# Patient Record
Sex: MALE | Race: WHITE | URBAN - METROPOLITAN AREA
[De-identification: names, ages, dates, MRNs, and addresses within clinical notes are randomized per-mention and may not be internally consistent; named-entity substitution may affect disease eponyms.]

---

## 2017-07-06 ENCOUNTER — OFFICE VISIT (OUTPATIENT)
Dept: SLEEP MEDICINE | Facility: CLINIC | Age: 52
End: 2017-07-06
Payer: COMMERCIAL

## 2017-07-06 VITALS
HEIGHT: 72 IN | OXYGEN SATURATION: 96 % | DIASTOLIC BLOOD PRESSURE: 92 MMHG | BODY MASS INDEX: 39.14 KG/M2 | SYSTOLIC BLOOD PRESSURE: 155 MMHG | RESPIRATION RATE: 18 BRPM | HEART RATE: 93 BPM | WEIGHT: 289 LBS

## 2017-07-06 DIAGNOSIS — R03.0 ELEVATED BP WITHOUT DIAGNOSIS OF HYPERTENSION: Primary | ICD-10-CM

## 2017-07-06 DIAGNOSIS — G47.33 OSA (OBSTRUCTIVE SLEEP APNEA): ICD-10-CM

## 2017-07-06 DIAGNOSIS — E66.09 NON MORBID OBESITY DUE TO EXCESS CALORIES: ICD-10-CM

## 2017-07-06 PROCEDURE — 99204 OFFICE O/P NEW MOD 45 MIN: CPT | Performed by: INTERNAL MEDICINE

## 2017-07-06 NOTE — PATIENT INSTRUCTIONS

## 2017-07-06 NOTE — PROGRESS NOTES
Sleep Consultation:    Date on this visit: 7/6/2017    Rubén Rosas is sent by No ref. provider found for a sleep consultation regarding possible ARMANDO.    Primary Physician: No primary care provider on file.     Rubén Rosas is a 51 year old male presenting with complaints of needing evaluation for ARMANDO.    He has a history of obesity.    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  Starts anywhere between 3 AM and 6 AM.  Usually finishing between 5 and 7 PM.  Not usually working Saturdays anymore.   Up anywhere between 2 and 4:45 AM depending on the day.  Typically getting into bed between 9 (at the earliest) and 10:30 PM.     Weekends he is up a little later and sleeps in until 7 - 8 AM.   Falls asleep quickly.  No TV in bedroom.    Sleep Disordered Breathing - Snores intermittently.  Denies witnessed apneas or snort arousals.     Occasional nocturia.  No nocturnal GERD.    Upon waking feels fine.  He denies morning headaches.  No morning dry mouth.  No morning sinus congestion.   Patient was counseled on the importance of driving while alert, to pull over if drowsy, or nap before getting into the vehicle if sleepy.    Movement/Behaviors - No somniloquy.  No somnambulism.  No sleep related eating.  No dream enactment behavior.   Patient denies typical restless legs syndrome symptoms.      Alcohol use - Drinking every night.  Weekdays typically having 1 - 2 drinks.  Weekends will have significantly more.    Caffeine intake - 1/2 cups/day of coffee and 1 can per day. Last caffeine intake is usually in the afternoon.  Tobacco exposure - Chewing tobacco (2 tins per week).  Illicit substances - None    Lives with wife and daughter, although 2 kids in college are home for the summer.  Has 2 pets, 1 dog and 1 cat.     Does have family history of Obstructive Sleep Apnea on CPAP in brother, and snoring in father.    Allergies:    No Known Allergies    Medications:     Current Outpatient Prescriptions   Medication Sig Dispense Refill     ADVIL 200 MG PO TABS 2 TABLET EVERY 4 TO 6 HOURS AS NEEDED         Problem List:  Patient Active Problem List    Diagnosis Date Noted     Elevated BP without diagnosis of hypertension 07/06/2017     Priority: Medium     Non morbid obesity due to excess calories 07/06/2017     Priority: Medium        Past Medical/Surgical History:  No past medical history on file.  No past surgical history on file.    Social History:  Social History     Social History     Marital status:      Spouse name: N/A     Number of children: N/A     Years of education: N/A     Occupational History     Not on file.     Social History Main Topics     Smoking status: Never Smoker     Smokeless tobacco: Former User     Types: Chew      Comment: quit chewing tobacco 2005     Alcohol use Not on file     Drug use: Not on file     Sexual activity: Not on file     Other Topics Concern     Not on file     Social History Narrative     No narrative on file       Family History:  No family history on file.    Review of Systems:  A complete review of systems reviewed by me is negative with the exeption of what has been mentioned in the history of present illness.  Numbness in arms or legs  Muscle pains; joint or bone pains; swollen joints    Physical Examination:  Vitals: BP (!) 155/92  Pulse 93  Resp 18  Ht 1.829 m (6')  Wt 131.1 kg (289 lb)  SpO2 96%  BMI 39.2 kg/m2  BMI= Body mass index is 39.2 kg/(m^2).    Neck Cir (cm): 42 cm    [place] Total Score 7/6/2017   Total score - [place] 1     General appearance: No apparent distress, well groomed.    HEENT:   Head: Normocephalic, atraumatic.  Eyes: PERRL  Nose: Nares widely patent.  No exudate.  No septal deviation noted.  Mouth: Teeth: receding gums               Tongue: Normal  Oropharynx:  Mallampati Classification: IV    Tonsils: Grade 0    Uvula: Normal    Neck: Supple without bruit.     Neck Cir (cm): 42 cm (7/6/2017  12:55 PM)    Cardiac: Regular rate and rhythm.  No murmurs.  Radial pulses are strong and symmetric.  Pulmonary: Symmetric air movement, lungs clear to auscultation bilaterally.  Musculoskeletal: No edema noted.  No clubbing or cyanosis.  Skin: Warm, dry, intact.  Neurologic: Alert and oriented to person, place and time   Gait is normal.  Psychiatric: Affect gruff.  Mood fine.     Impression/Plan:  1. ARMANDO (obstructive sleep apnea)  I have a high suspicion for ARMANDO based on presence of snoring, enlarged neck girth, and obesity with excessive daytime sleepiness. We discussed pathophysiology of obstructive sleep apnea, testing with overnight polysomnography, and treatment modalities (CPAP only discussed at this visit). We discussed consequences of untreated ARMANDO. Patient is interested in treatment and willing to undergo overnight sleep testing.    Home sleep testing is appropriate for this patient. Ordered WatchPAT with Chain of Custody verification.  Study has been ordered today.    - HST-Home Sleep Apnea Test; Future    2. Elevated BP without diagnosis of hypertension  Patient was counseled on the effects of untreated/sub-optimally treated hypertension.  He was told to discuss findings with his PCP. Patient to follow up with Primary Care provider regarding elevated blood pressure.     3. Non morbid obesity due to excess calories  We discussed the link between obesity, sleep apnea, and health outcomes.  Patient was encouraged to decrease caloric intake and increase activity levels to try to move towards a normal weight.  He was encouraged to discuss further strategies with his primary care provider.     Literature provided regarding sleep apnea.      Obstructive sleep apnea reviewed.  Complications of untreated sleep apnea were reviewed.    Dave Hatfield MD, Sleep Physician  Jul 6, 2017     CC: No ref. provider found

## 2017-07-06 NOTE — NURSING NOTE
Chief Complaint   Patient presents with     Sleep Problem     referred due to weight - new       Initial BP (!) 155/92  Pulse 93  Resp 18  Ht 1.829 m (6')  Wt 131.1 kg (289 lb)  SpO2 96%  BMI 39.2 kg/m2 Estimated body mass index is 39.2 kg/(m^2) as calculated from the following:    Height as of this encounter: 1.829 m (6').    Weight as of this encounter: 131.1 kg (289 lb).  Medication Reconciliation: complete     ESS 1  Neck 42cm    Daksha [place]  Sleep Clinic - Specialist

## 2017-07-06 NOTE — MR AVS SNAPSHOT
After Visit Summary   7/6/2017    Rubén Rosas    MRN: [id number]           Patient Information     Date Of Birth          1965        Visit Information        Provider Department      7/6/2017 1:00 PM Dave Hatfield MD [facility] [place]        Today's Diagnoses     Elevated BP without diagnosis of hypertension    -  1    ARMANDO (obstructive sleep apnea)        Non morbid obesity due to excess calories          Care Instructions      Your BMI is Body mass index is 39.2 kg/(m^2).  Weight management is a personal decision.  If you are interested in exploring weight loss strategies, the following discussion covers the approaches that may be successful. Body mass index (BMI) is one way to tell whether you are at a healthy weight, overweight, or obese. It measures your weight in relation to your height.  A BMI of 18.5 to 24.9 is in the healthy range. A person with a BMI of 25 to 29.9 is considered overweight, and someone with a BMI of 30 or greater is considered obese. More than two-thirds of American adults are considered overweight or obese.  Being overweight or obese increases the risk for further weight gain. Excess weight may lead to heart disease and diabetes.  Creating and following plans for healthy eating and physical activity may help you improve your health.  Weight control is part of healthy lifestyle and includes exercise, emotional health, and healthy eating habits. Careful eating habits lifelong are the mainstay of weight control. Though there are significant health benefits from weight loss, long-term weight loss with diet alone may be very difficult to achieve- studies show long-term success with dietary management in less than 10% of people. Attaining a healthy weight may be especially difficult to achieve in those with severe obesity. In some cases, medications, devices and surgical management might be considered.  What can you do?  If you are overweight or  obese and are interested in methods for weight loss, you should discuss this with your provider.     Consider reducing daily calorie intake by 500 calories.     Keep a food journal.     Avoiding skipping meals, consider cutting portions instead.    Diet combined with exercise helps maintain muscle while optimizing fat loss. Strength training is particularly important for building and maintaining muscle mass. Exercise helps reduce stress, increase energy, and improves fitness. Increasing exercise without diet control, however, may not burn enough calories to loose weight.       Start walking three days a week 10-20 minutes at a time    Work towards walking thirty minutes five days a week     Eventually, increase the speed of your walking for 1-2 minutes at time    In addition, we recommend that you review healthy lifestyles and methods for weight loss available through the National Institutes of Health patient information sites:  http://win.niddk.nih.gov/publications/index.htm    And look into health and wellness programs that may be available through your health insurance provider, employer, local community center, or armand club.    Weight management plan: Patient was referred to their PCP to discuss a diet and exercise plan.  Patient to follow up with Primary Care provider regarding elevated blood pressure.            Follow-ups after your visit        Your next 10 appointments already scheduled     Jul 10, 2017  2:30 PM CDT   HST  with BED 7 SH SLEEP   [facility])    [address]  [place] [address]   [phone]            Jul 11, 2017  1:30 PM CDT   HST Drop Off with  SLEEP CENTER DME   [facility])    [address]  [place] [address]   [phone]            Jul 21, 2017  1:00 PM CDT   Return Sleep Patient with Dave Hatfield MD   [facility]  "[facility] ([facility])    [address] [id number] [phone]              Future tests that were ordered for you today     Open Future Orders        Priority Expected Expires Ordered    HST-Home Sleep Apnea Test Routine  2018            Who to contact     If you have questions or need follow up information about today's clinic visit or your schedule please contact [place] [facility] directly at [phone].  Normal or non-critical lab and imaging results will be communicated to you by MyChart, letter or phone within 4 business days after the clinic has received the results. If you do not hear from us within 7 days, please contact the clinic through [facility] or phone. If you have a critical or abnormal lab result, we will notify you by phone as soon as possible.  Submit refill requests through [employer] or call your pharmacy and they will forward the refill request to us. Please allow 3 business days for your refill to be completed.          Additional Information About Your Visit        [facility] Information     [employer] lets you send messages to your doctor, view your test results, renew your prescriptions, schedule appointments and more. To sign up, go to [email or web address] . Click on \"Log in\" on the left side of the screen, which will take you to the Welcome page. Then click on \"Sign up Now\" on the right side of the page.     You will be asked to enter the access code listed below, as well as some personal information. Please follow the directions to create your username and password.     Your access code is: [email or web address]  Expires: 10/4/2017  1:25 PM     Your access code will  in 90 days. If you need help or a new code, please call your [place] clinic or [phone].        Care EveryWhere ID     This is your Care EveryWhere ID. This could be used by other organizations to access your [place] medical records  [id number]   "      Your Vitals Were     Pulse Respirations Height Pulse Oximetry BMI (Body Mass Index)       93 18 1.829 m (6') 96% 39.2 kg/m2        Blood Pressure from Last 3 Encounters:   07/06/17 (!) 155/92   12/03/13 132/68   11/26/13 140/80    Weight from Last 3 Encounters:   07/06/17 131.1 kg (289 lb)   12/03/13 (!) 139.7 kg (308 lb)   11/26/13 (!) 139.7 kg (308 lb)               Primary Care Provider    None Specified       No primary provider on file.        Equal Access to Services     [facility]: Hadrosie Cordero, mark ford, josé luis espinoza, jeremiah emery. So [facility] [phone].    ATENCIÓN: Si habla español, tiene a canela disposición servicios gratuitos de asistencia lingüística. Llame al [phone].    We comply with applicable federal civil rights laws and [place] laws. We do not discriminate on the basis of race, color, national origin, age, disability sex, sexual orientation or gender identity.            Thank you!     Thank you for choosing [place] [facility]  for your care. Our goal is always to provide you with excellent care. Hearing back from our patients is one way we can continue to improve our services. Please take a few minutes to complete the written survey that you may receive in the mail after your visit with us. Thank you!             Your Updated Medication List - Protect others around you: Learn how to safely use, store and throw away your medicines at www.disposemymeds.org.          This list is accurate as of: 7/6/17  1:47 PM.  Always use your most recent med list.                   Brand Name Dispense Instructions for use Diagnosis    ADVIL 200 MG tablet   Generic drug:  ibuprofen      2 TABLET EVERY 4 TO 6 HOURS AS NEEDED

## 2017-07-10 ENCOUNTER — OFFICE VISIT (OUTPATIENT)
Dept: SLEEP MEDICINE | Facility: CLINIC | Age: 52
End: 2017-07-10
Payer: COMMERCIAL

## 2017-07-10 DIAGNOSIS — E66.09 NON MORBID OBESITY DUE TO EXCESS CALORIES: ICD-10-CM

## 2017-07-10 DIAGNOSIS — G47.33 OSA (OBSTRUCTIVE SLEEP APNEA): Primary | ICD-10-CM

## 2017-07-10 DIAGNOSIS — R03.0 ELEVATED BP WITHOUT DIAGNOSIS OF HYPERTENSION: ICD-10-CM

## 2017-07-10 PROCEDURE — 95800 SLP STDY UNATTENDED: CPT | Performed by: INTERNAL MEDICINE

## 2017-07-10 NOTE — PROGRESS NOTES
Patient instructed on WatchPAT use. Patient demonstrated and verbalized knowledge of use.  Device will be returned tomorrow before noon.    Daksha [place]  Sleep Clinic - Specialist

## 2017-07-10 NOTE — MR AVS SNAPSHOT
After Visit Summary   7/10/2017    Rubén Rosas    MRN: [id number]           Patient Information     Date Of Birth          1965        Visit Information        Provider Department      7/10/2017 2:30 PM BED 7  SLEEP [facility]        Today's Diagnoses     ARMANDO (obstructive sleep apnea)    -  1    Elevated BP without diagnosis of hypertension        Non morbid obesity due to excess calories           Follow-ups after your visit        Your next 10 appointments already scheduled     Jul 11, 2017  1:30 PM CDT   HST Drop Off with  SLEEP CENTER DME   [facility])    [address] [address]   [phone]            Jul 21, 2017  1:00 PM CDT   Return Sleep Patient with Dave Hatfield MD   [facility])    [address] [id number] [phone]              Who to contact     If you have questions or need follow up information about today's clinic visit or your schedule please contact [facility] directly at [phone].  Normal or non-critical lab and imaging results will be communicated to you by Patient Home Monitoringhart, letter or phone within 4 business days after the clinic has received the results. If you do not hear from us within 7 days, please contact the clinic through Patient [facility] or phone. If you have a critical or abnormal lab result, we will notify you by phone as soon as possible.  Submit refill requests through [employer] or call your pharmacy and they will forward the refill request to us. Please allow 3 business days for your refill to be completed.          Additional Information About Your Visit        MyChart Information     [employer] lets you send messages to your doctor, view your test results, renew your prescriptions, schedule appointments and more. To sign up, go to [email or web address] .  "Click on \"Log in\" on the left side of the screen, which will take you to the Welcome page. Then click on \"Sign up Now\" on the right side of the page.     You will be asked to enter the access code listed below, as well as some personal information. Please follow the directions to create your username and password.     Your access code is: [email or web address]  Expires: 10/4/2017  1:25 PM     Your access code will  in 90 days. If you need help or a new code, please call your [place] clinic or [phone].        Care EveryWhere ID     This is your Care EveryWhere ID. This could be used by other organizations to access your [place] medical records  [id number]         Blood Pressure from Last 3 Encounters:   17 (!) 155/92   13 132/68   13 140/80    Weight from Last 3 Encounters:   17 131.1 kg (289 lb)   13 (!) 139.7 kg (308 lb)   13 (!) 139.7 kg (308 lb)              Today, you had the following     No orders found for display       Primary Care Provider    None Specified       No primary provider on file.        Equal Access to Services     [facility] : Hadii asher perezo Sosilvina, waaxda luqadaha, qaybta kaalmada adeegyada, jeremiah serrano . So [facility] [phone].    ATENCIÓN: Si habla español, tiene a canela disposición servicios gratuitos de asistencia lingüística. Llame al [phone].    We comply with applicable federal civil rights laws and [place] laws. We do not discriminate on the basis of race, color, national origin, age, disability sex, sexual orientation or gender identity.            Thank you!     Thank you for choosing [place] [facility]  for your care. Our goal is always to provide you with excellent care. Hearing back from our patients is one way we can continue to improve our services. Please take a few minutes to complete the written survey that you may receive in the mail after your visit with us. Thank you!           "   Your Updated Medication List - Protect others around you: Learn how to safely use, store and throw away your medicines at www.disposemymeds.org.          This list is accurate as of: 7/10/17  4:28 PM.  Always use your most recent med list.                   Brand Name Dispense Instructions for use Diagnosis    ADVIL 200 MG tablet   Generic drug:  ibuprofen      2 TABLET EVERY 4 TO 6 HOURS AS NEEDED

## 2017-07-11 NOTE — PROCEDURES
WatchPAT - HOME SLEEP STUDY INTERPRETATION    Patient: Rubén Rosas  MRN: [id number]  YOB: 1965  Study Date: 7/10/2017  Referring Provider: No primary care provider on file.;   Ordering Provider: Dave Hatfield MD     Indications for Home Study: Rubén Rosas is a 51 year old male with a history of morbid obesity and hypertension who presents with symptoms suggestive of obstructive sleep apnea.    Estimated body mass index is 39.2 kg/(m^2) as calculated from the following:    Height as of 7/6/17: 1.829 m (6').    Weight as of 7/6/17: 131.1 kg (289 lb).  Total score - [place]: 1 (7/6/2017 12:00 PM)  [place] Total Score: 5 (7/6/2017  1:00 PM)    Data: A full night home sleep study was performed recording the standard physiologic parameters including peripheral arterial tonometry (PAT), sound/snoring, body position,  movement, sound, and oxygen saturation by pulse oximetry. Pulse rate was estimated by oximetry recording. Sleep staging (wake, REM, light, and deep sleep) was derived from PAT signal.  This study was considered adequate based on > 4 hours of quality oximetry and respiratory recording. As specified by the AASM Manual for the Scoring of Sleep and Associated events, version 2.3, Rule VIII.D 1B, 4% oxygen desaturation scoring for hypopneas is used as a standard of care on all home sleep apnea testing.    Total Recording Time: 6 hrs, 58 min  Total Sleep Time: 5 hrs, 59 min  % of Sleep Time REM: 25.5%    Respiratory:  Snoring: Snoring was present.  Respiratory events: The PAT respiratory disturbance index [pRDI] was 26.6 events per hour.  The PAT apnea/hypopnea index [pAHI] was 25.1 events per hour.  HEATHER was 22.7 events per hour.  During REM sleep the pAHI was 40.7.  Sleep Associated Hypoxemia: sustained hypoxemia was not present. Mean oxygen saturation was 93%.  Minimum was 78%.  Time with saturation less than 88% was 3.5 minutes.    Heart Rate: By pulse oximetry normal rate was  noted.     Position: Percent of time spent: supine - 68.1%, prone - 16.1%, on right - 6.1%, on left - 9.7%.  pAHI was 29.9 per hour supine, 6.3 per hour prone, 11 per hour on right side, and 31.4 per hour on left side.     Assessment:   Moderate obstructive sleep apnea.  Sleep associated hypoxemia was not present.    Recommendations:  Consider auto-CPAP at 6-18 cmH2O, oral appliance therapy, polysomnography with full night PAP titration or surgical options.  Suggest optimizing sleep hygiene and avoiding sleep deprivation.  Weight management.    Diagnosis Code(s): Obstructive Sleep Apnea G47.33    Dave Hatfield MD, July 11, 2017   Diplomate, American Board of Internal Medicine, Sleep Medicine

## 2017-07-21 ENCOUNTER — OFFICE VISIT (OUTPATIENT)
Dept: SLEEP MEDICINE | Facility: CLINIC | Age: 52
End: 2017-07-21
Payer: COMMERCIAL

## 2017-07-21 VITALS
DIASTOLIC BLOOD PRESSURE: 96 MMHG | SYSTOLIC BLOOD PRESSURE: 141 MMHG | HEIGHT: 72 IN | BODY MASS INDEX: 39.9 KG/M2 | RESPIRATION RATE: 18 BRPM | HEART RATE: 82 BPM | WEIGHT: 294.6 LBS | OXYGEN SATURATION: 95 %

## 2017-07-21 DIAGNOSIS — G47.33 OSA (OBSTRUCTIVE SLEEP APNEA): ICD-10-CM

## 2017-07-21 DIAGNOSIS — R03.0 ELEVATED BP WITHOUT DIAGNOSIS OF HYPERTENSION: ICD-10-CM

## 2017-07-21 DIAGNOSIS — E66.09 NON MORBID OBESITY DUE TO EXCESS CALORIES: ICD-10-CM

## 2017-07-21 PROCEDURE — 99214 OFFICE O/P EST MOD 30 MIN: CPT | Performed by: INTERNAL MEDICINE

## 2017-07-21 NOTE — PATIENT INSTRUCTIONS

## 2017-07-21 NOTE — NURSING NOTE
Chief Complaint   Patient presents with     Study Results     hst f/u       Initial BP (!) 149/97  Pulse 82  Resp 18  Ht 1.829 m (6')  Wt 133.6 kg (294 lb 9.6 oz)  SpO2 95%  BMI 39.95 kg/m2 Estimated body mass index is 39.95 kg/(m^2) as calculated from the following:    Height as of this encounter: 1.829 m (6').    Weight as of this encounter: 133.6 kg (294 lb 9.6 oz).  Medication Reconciliation: complete     ESS 3  Leticia Manzanares

## 2017-07-21 NOTE — MR AVS SNAPSHOT
After Visit Summary   7/21/2017    Rubén Rosas    MRN: [id number]           Patient Information     Date Of Birth          1965        Visit Information        Provider Department      7/21/2017 1:00 PM Dave Hatfield MD [facility]        Today's Diagnoses     Elevated BP without diagnosis of hypertension        Non morbid obesity due to excess calories        ARMANDO (obstructive sleep apnea)          Care Instructions      Your BMI is Body mass index is 39.95 kg/(m^2).  Weight management is a personal decision.  If you are interested in exploring weight loss strategies, the following discussion covers the approaches that may be successful. Body mass index (BMI) is one way to tell whether you are at a healthy weight, overweight, or obese. It measures your weight in relation to your height.  A BMI of 18.5 to 24.9 is in the healthy range. A person with a BMI of 25 to 29.9 is considered overweight, and someone with a BMI of 30 or greater is considered obese. More than two-thirds of American adults are considered overweight or obese.  Being overweight or obese increases the risk for further weight gain. Excess weight may lead to heart disease and diabetes.  Creating and following plans for healthy eating and physical activity may help you improve your health.  Weight control is part of healthy lifestyle and includes exercise, emotional health, and healthy eating habits. Careful eating habits lifelong are the mainstay of weight control. Though there are significant health benefits from weight loss, long-term weight loss with diet alone may be very difficult to achieve- studies show long-term success with dietary management in less than 10% of people. Attaining a healthy weight may be especially difficult to achieve in those with severe obesity. In some cases, medications, devices and surgical management might be considered.  What can you do?  If you are overweight or  obese and are interested in methods for weight loss, you should discuss this with your provider.     Consider reducing daily calorie intake by 500 calories.     Keep a food journal.     Avoiding skipping meals, consider cutting portions instead.    Diet combined with exercise helps maintain muscle while optimizing fat loss. Strength training is particularly important for building and maintaining muscle mass. Exercise helps reduce stress, increase energy, and improves fitness. Increasing exercise without diet control, however, may not burn enough calories to loose weight.       Start walking three days a week 10-20 minutes at a time    Work towards walking thirty minutes five days a week     Eventually, increase the speed of your walking for 1-2 minutes at time    In addition, we recommend that you review healthy lifestyles and methods for weight loss available through the National Institutes of Health patient information sites:  http://win.niddk.nih.gov/publications/index.htm    And look into health and wellness programs that may be available through your health insurance provider, employer, local community center, or armand club.    Weight management plan: Patient was referred to their PCP to discuss a diet and exercise plan.   Patient to follow up with Primary Care provider regarding elevated blood pressure.            Follow-ups after your visit        Your next 10 appointments already scheduled     Jul 25, 2017 10:30 AM CDT   PAP SETUP with  SLEEP CENTER DME   [facility]    [address] [place] [id number] [phone]              Who to contact     If you have questions or need follow up information about today's clinic visit or your schedule please contact [facility] directly at [phone].  Normal or non-critical lab and imaging results will be communicated to you by MyChart, letter or phone within 4 business days  "after the clinic has received the results. If you do not hear from us within 7 days, please contact the clinic through [employer] or phone. If you have a critical or abnormal lab result, we will notify you by phone as soon as possible.  Submit refill requests through [employer] or call your pharmacy and they will forward the refill request to us. Please allow 3 business days for your refill to be completed.          Additional Information About Your Visit        [employer] Information     [employer] lets you send messages to your doctor, view your test results, renew your prescriptions, schedule appointments and more. To sign up, go to [email or web address] . Click on \"Log in\" on the left side of the screen, which will take you to the Welcome page. Then click on \"Sign up Now\" on the right side of the page.     You will be asked to enter the access code listed below, as well as some personal information. Please follow the directions to create your username and password.     Your access code is: [email or web address]  Expires: 10/4/2017  1:25 PM     Your access code will  in 90 days. If you need help or a new code, please call your [place] clinic or [phone].        Care EveryWhere ID     This is your Care EveryWhere ID. This could be used by other organizations to access your [place] medical records  [id number]        Your Vitals Were     Pulse Respirations Height Pulse Oximetry BMI (Body Mass Index)       82 18 1.829 m (6') 95% 39.95 kg/m2        Blood Pressure from Last 3 Encounters:   17 (!) 141/96   17 (!) 155/92   13 132/68    Weight from Last 3 Encounters:   17 133.6 kg (294 lb 9.6 oz)   17 131.1 kg (289 lb)   13 (!) 139.7 kg (308 lb)              We Performed the Following     Sleep Comprehensive DME        Primary Care Provider    None Specified       No primary provider on file.        Equal Access to Services     ERLIN FOX AH: Hadii mark Pulido, " jeremiah vargas luthermana jumana emery. So [facility] [phone].    ATENCIÓN: Si habla neftali, tiene a canela disposición servicios gratuitos de asistencia lingüística. Erin al [phone].    We comply with applicable federal civil rights laws and [place] laws. We do not discriminate on the basis of race, color, national origin, age, disability sex, sexual orientation or gender identity.            Thank you!     Thank you for choosing [facility]  for your care. Our goal is always to provide you with excellent care. Hearing back from our patients is one way we can continue to improve our services. Please take a few minutes to complete the written survey that you may receive in the mail after your visit with us. Thank you!             Your Updated Medication List - Protect others around you: Learn how to safely use, store and throw away your medicines at www.disposemymeds.org.          This list is accurate as of: 7/21/17  1:44 PM.  Always use your most recent med list.                   Brand Name Dispense Instructions for use Diagnosis    ADVIL 200 MG tablet   Generic drug:  ibuprofen      2 TABLET EVERY 4 TO 6 HOURS AS NEEDED

## 2017-07-21 NOTE — PROGRESS NOTES
Chief Complaint   Patient presents with     Study Results     hst f/u       Rubén Rosas is a 51 year old male who returns to [facility] for review of sleep testing results. He presented with symptoms suggestive of obstructive sleep apnea.    Estimated body mass index is 39.95 kg/(m^2) as calculated from the following:    Height as of this encounter: 1.829 m (6').    Weight as of this encounter: 133.6 kg (294 lb 9.6 oz).  Total score - [place]: 3 (7/21/2017 12:00 PM)  StopBang Total Score: 5 (7/6/2017  1:00 PM)    Home Sleep Apnea Testing - 7/20/17: 294 lbs 9.6 oz: AHI 25.1/hr. Supine AHI 29.9/hr.   Oxygen Mitul of 78%.  Baseline 93%.  Sp02 =< 88% for 3.5 minutes  He slept on his back (68.1%), prone (16.1%), left (6.1) and right (9.7%) sides.   Analysis time: 5 hr 59 minutes.     Rubén Rosas reports that he slept Good .     Results were reviewed in detail today with Rubén and a copy given to him for his records.    Reviewed by team: Allergies  Meds  Problems       Reviewed by provider: Problems           Problem List:  Patient Active Problem List    Diagnosis Date Noted     ARMANDO (obstructive sleep apnea) 07/21/2017     Priority: Medium     7/21/2017 - Home Sleep Apnea Testing - AHI 25.1/hr; Supine AHI 29.9/hr; SpO2 <= 88% for 3.5 minutes.       Elevated BP without diagnosis of hypertension 07/06/2017     Priority: Medium     Non morbid obesity due to excess calories 07/06/2017     Priority: Medium        BP (!) 149/97  Pulse 82  Resp 18  Ht 1.829 m (6')  Wt 133.6 kg (294 lb 9.6 oz)  SpO2 95%  BMI 39.95 kg/m2    Impression/Plan:  1. Elevated BP without diagnosis of hypertension  Patient to follow up with Primary Care provider regarding elevated blood pressure.     2. Non morbid obesity due to excess calories  We discussed the link between obesity, sleep apnea, and health outcomes.  Patient was encouraged to decrease caloric intake and increase activity levels to try to move  towards a normal weight.  He was encouraged to discuss further strategies with his primary care provider.     3. ARMANDO (obstructive sleep apnea)  I reviewed the diagnosis of obstructive sleep apnea with patient.  We discussed health consequences of untreated sleep apnea and benefits of treatment.  He is interested in starting treatment of ARMANDO with PAP therapy.  Reviewed importance of nightly therapy for effective treatment of ARMANDO, and he voiced understanding and agreement.  We also reviewed importance of using PAP during the entire sleep duration to achieve maximal benefits, but reviewed that a minimum of 4 hours per night was required.  He is confident that he can achieve these goals and is willing to start therapy as soon as possible.    - Sleep Comprehensive DME   Moderate Obstructive Sleep Apnea.   Sleep associated hypoxemia was not present.      needs compliance monitoring.    Treatment options discussed today including auto-CPAP at 6-18 cmH2O, oral appliance therapy or polysomnography with full night PAP titration.  Elected treatment with auto-CPAP at 6-18 cmH2O.    He will follow up with me in about 2 month(s).     Twenty-five minutes spent with patient, all of which were spent face-to-face counseling, consulting, coordinating plan of care.      Dave Hatfield MD    CC:  No primary care provider on file.,  (only for reference, does not automatically route).

## 2017-07-25 ENCOUNTER — DOCUMENTATION ONLY (OUTPATIENT)
Dept: SLEEP MEDICINE | Facility: CLINIC | Age: 52
End: 2017-07-25

## 2017-07-25 DIAGNOSIS — R03.0 ELEVATED BP WITHOUT DIAGNOSIS OF HYPERTENSION: ICD-10-CM

## 2017-07-25 DIAGNOSIS — E66.09 NON MORBID OBESITY DUE TO EXCESS CALORIES: ICD-10-CM

## 2017-07-25 DIAGNOSIS — G47.33 OSA (OBSTRUCTIVE SLEEP APNEA): ICD-10-CM

## 2017-07-25 NOTE — PROGRESS NOTES
Patient was offered [facility]  Patient Rubén Rosas was set up at [facility] on July 25, 2017. Patient received a Resmed AirSense 10 Auto. Pressures were set at 6-18 cm H2O.   Patient s ramp is 6 cm H2O for Auto and FLEX/EPR is 2.  Patient received a Resmed Mask name: AIRFIT N20  Nasal mask Size Medium, heated tubing and heated humidifier.  Patient is enrolled in the STM Program and does not need to meet compliance. Patient has a follow up on 9/14/17 with Dr. Hatfield.    Jazmyn Yoon

## 2017-07-28 ENCOUNTER — DOCUMENTATION ONLY (OUTPATIENT)
Dept: SLEEP MEDICINE | Facility: CLINIC | Age: 52
End: 2017-07-28

## 2017-07-28 DIAGNOSIS — G47.33 OSA (OBSTRUCTIVE SLEEP APNEA): ICD-10-CM

## 2017-07-28 DIAGNOSIS — E66.09 NON MORBID OBESITY DUE TO EXCESS CALORIES: ICD-10-CM

## 2017-07-28 DIAGNOSIS — R03.0 ELEVATED BP WITHOUT DIAGNOSIS OF HYPERTENSION: ICD-10-CM

## 2017-07-28 NOTE — PROGRESS NOTES
3 DAY STM VISIT    Patient contacted for 3 day STM visit  Subjective measures:  Pt reports that he had to go out of town for work right after receiving the machine and did not bring it with him.  He will be back early next week and will be starting then.      Device type: Auto-CPAP  PAP settings: CPAP min 6 cm  H20     CPAP max 18 cm  H20            Assessment: No usage reporting.  Action plan: Pt to have f/u 14 day STM visit.  Pt instructed to call after use begins with any questions or concerns.  Diagnostic AHI: 25.1 watcPAT

## 2017-08-09 ENCOUNTER — DOCUMENTATION ONLY (OUTPATIENT)
Dept: SLEEP MEDICINE | Facility: CLINIC | Age: 52
End: 2017-08-09

## 2017-08-09 DIAGNOSIS — G47.33 OSA (OBSTRUCTIVE SLEEP APNEA): ICD-10-CM

## 2017-08-09 DIAGNOSIS — E66.09 NON MORBID OBESITY DUE TO EXCESS CALORIES: ICD-10-CM

## 2017-08-09 DIAGNOSIS — R03.0 ELEVATED BP WITHOUT DIAGNOSIS OF HYPERTENSION: ICD-10-CM

## 2017-08-09 NOTE — PROGRESS NOTES
14 DAY STM VISIT    Subjective measures:   Pt having some issues with mouth popping open causing arousals.  He is still feeling sleepy after using device.     Assessment: Pt not meeting objective benchmarks for compliance  Patient failing following subjective benchmarks: pressure issues  Action plan: Pt to have 30 day [facility] visit.  Recheck next week to see if data is transmitting, pt reporting using almost nightly.  He was instructed to increase humidity for mouth opening.     Device type: Auto-CPAP  PAP settings: CPAP min 6 cm  H20     CPAP max 18 cm  H20    95th% pressure 10 cm  H20   Objective measures: 14 day rolling measures -only one day of data reporting.      Compliance  10 %      Leak  34.8 lpm  last  upload      AHI 2.5   last  upload      Average number of minutes 31    Diagnostic AHI 25.1 watchpat    Objective measure goal  Compliance   Goal >70%  Leak   Goal < 24 lpm  AHI  Goal < 5  Usage  Goal >240

## 2017-08-16 ENCOUNTER — DOCUMENTATION ONLY (OUTPATIENT)
Dept: SLEEP MEDICINE | Facility: CLINIC | Age: 52
End: 2017-08-16

## 2017-08-28 ENCOUNTER — DOCUMENTATION ONLY (OUTPATIENT)
Dept: SLEEP MEDICINE | Facility: CLINIC | Age: 52
End: 2017-08-28

## 2017-08-28 DIAGNOSIS — R03.0 ELEVATED BP WITHOUT DIAGNOSIS OF HYPERTENSION: ICD-10-CM

## 2017-08-28 DIAGNOSIS — G47.33 OSA (OBSTRUCTIVE SLEEP APNEA): ICD-10-CM

## 2017-08-28 DIAGNOSIS — E66.09 NON MORBID OBESITY DUE TO EXCESS CALORIES: ICD-10-CM

## 2017-08-29 NOTE — PROGRESS NOTES
30 DAY STM VISIT    Subjective measures:  Pt feels things are getting better, he is finding the mask off sometimes during the night.      Assessment: Pt not meeting objective benchmarks for compliance  Patient failing following subjective benchmarks: pressure issues  Action plan: 2 week STM recheck appt scheduled  Patient has a follow up visit with Dr. Hatfield on 9/14/2017.   Device type: Auto-CPAP  PAP settings: CPAP min 6 cm  H20     CPAP max 18 cm  H20    95th% pressure 8.8 cm  H20   Objective measures: 14 day rolling measures      Compliance  28 %      Leak  34.25 lpm  last  upload      AHI 3.67   last  upload      Average number of minutes 168             Objective measure goal  Compliance   Goal >70%  Leak   Goal < 24 lpm  AHI  Goal < 5  Usage  Goal >240

## 2017-09-13 ENCOUNTER — DOCUMENTATION ONLY (OUTPATIENT)
Dept: SLEEP MEDICINE | Facility: CLINIC | Age: 52
End: 2017-09-13

## 2017-09-13 NOTE — PROGRESS NOTES
STM Re-Check: Patient has an appointment on 9/14/2017 with his Provider will schedule STM Re-Check next week.

## 2017-09-14 ENCOUNTER — OFFICE VISIT (OUTPATIENT)
Dept: SLEEP MEDICINE | Facility: CLINIC | Age: 52
End: 2017-09-14
Payer: COMMERCIAL

## 2017-09-14 VITALS
SYSTOLIC BLOOD PRESSURE: 160 MMHG | RESPIRATION RATE: 16 BRPM | BODY MASS INDEX: 39.77 KG/M2 | HEART RATE: 72 BPM | WEIGHT: 293.6 LBS | HEIGHT: 72 IN | DIASTOLIC BLOOD PRESSURE: 103 MMHG | OXYGEN SATURATION: 96 %

## 2017-09-14 DIAGNOSIS — E66.09 NON MORBID OBESITY DUE TO EXCESS CALORIES: ICD-10-CM

## 2017-09-14 DIAGNOSIS — I10 ESSENTIAL HYPERTENSION: ICD-10-CM

## 2017-09-14 DIAGNOSIS — G47.33 OSA (OBSTRUCTIVE SLEEP APNEA): ICD-10-CM

## 2017-09-14 PROCEDURE — 99213 OFFICE O/P EST LOW 20 MIN: CPT | Performed by: INTERNAL MEDICINE

## 2017-09-14 NOTE — NURSING NOTE
Chief Complaint   Patient presents with     CPAP Follow Up     CPAP f/u       Initial BP (!) 160/103  Pulse 72  Resp 16  Ht 1.829 m (6')  Wt 133.2 kg (293 lb 9.6 oz)  SpO2 96%  BMI 39.82 kg/m2 Estimated body mass index is 39.82 kg/(m^2) as calculated from the following:    Height as of this encounter: 1.829 m (6').    Weight as of this encounter: 133.2 kg (293 lb 9.6 oz).  Medication Reconciliation: complete     ESS 1  Leticia Manzanares

## 2017-09-14 NOTE — PROGRESS NOTES
Obstructive Sleep Apnea - PAP Follow-Up Visit:    Chief Complaint   Patient presents with     CPAP Follow Up     CPAP f/u       Rubén Rosas comes in today for follow-up of their moderate sleep apnea, managed with CPAP.     Overall, he rates the experience with PAP as 5 (0 poor, 10 great). The mask is comfortable.    The mask is not leaking .  He is not snoring with the mask on. He is not having gasp arousals.  He is having significant oral/nasal dryness. The pressure is comfortable.     His PAP interface is Nasal Mask.    He does feel rested in the morning.    Total score - [place]: 1 (9/14/2017  9:00 AM)    ResMed   Auto-PAP 6.0 - 18.0 cmH2O 30 day usage data:    33% of days with > 4 hours of use. 5/30 days with no use. Average use 178 minutes per day.   95%ile Leak 34.99 L/min.   CPAP 95% pressure 8.8 cm.   AHI 3.31 events per hour.       Reviewed by team: Allergies  Meds       Reviewed by provider:        Problem List:  Patient Active Problem List    Diagnosis Date Noted     ARMANDO (obstructive sleep apnea) 07/21/2017     Priority: Medium     7/21/2017 - Home Sleep Apnea Testing - AHI 25.1/hr; Supine AHI 29.9/hr; SpO2 <= 88% for 3.5 minutes.       Essential hypertension 07/06/2017     Priority: Medium     Non morbid obesity due to excess calories 07/06/2017     Priority: Medium          BP (!) 160/103  Pulse 72  Resp 16  Ht 1.829 m (6')  Wt 133.2 kg (293 lb 9.6 oz)  SpO2 96%  BMI 39.82 kg/m2    Impression/Plan:  1. ARMANDO (obstructive sleep apnea)  Moderate sleep apnea. Tolerating PAP ok.  Daytime symptoms are improved..   Working on improving compliance but not quite there yet. 25/30 days with use, but only 10/30 with > 4 hours of compliance.    2. Essential hypertension  Patient to follow up with Primary Care provider regarding elevated blood pressure.  Patient was counseled on the effects of untreated/sub-optimally treated hypertension.  He was told to discuss findings with his PCP.     3. Non morbid  obesity due to excess calories  We discussed the link between obesity, sleep apnea, and health outcomes.  Patient was encouraged to decrease caloric intake and increase activity levels to try to move towards a normal weight.  He was encouraged to discuss further strategies with his primary care provider.       Rubén Rosas will follow up in about 1 year(s).     Fifteen minutes spent with patient, all of which were spent face-to-face counseling, consulting, coordinating plan of care.      Dave Hatfield MD    CC:  No primary care provider on file.,

## 2017-09-14 NOTE — PATIENT INSTRUCTIONS

## 2017-09-14 NOTE — MR AVS SNAPSHOT
After Visit Summary   9/14/2017    Rubén Rosas    MRN: [id number]           Patient Information     Date Of Birth          1965        Visit Information        Provider Department      9/14/2017 10:00 AM Dave Hatfield MD [facility]        Today's Diagnoses     ARMANDO (obstructive sleep apnea)        Essential hypertension        Non morbid obesity due to excess calories          Care Instructions      Your BMI is Body mass index is 39.82 kg/(m^2).  Weight management is a personal decision.  If you are interested in exploring weight loss strategies, the following discussion covers the approaches that may be successful. Body mass index (BMI) is one way to tell whether you are at a healthy weight, overweight, or obese. It measures your weight in relation to your height.  A BMI of 18.5 to 24.9 is in the healthy range. A person with a BMI of 25 to 29.9 is considered overweight, and someone with a BMI of 30 or greater is considered obese. More than two-thirds of American adults are considered overweight or obese.  Being overweight or obese increases the risk for further weight gain. Excess weight may lead to heart disease and diabetes.  Creating and following plans for healthy eating and physical activity may help you improve your health.  Weight control is part of healthy lifestyle and includes exercise, emotional health, and healthy eating habits. Careful eating habits lifelong are the mainstay of weight control. Though there are significant health benefits from weight loss, long-term weight loss with diet alone may be very difficult to achieve- studies show long-term success with dietary management in less than 10% of people. Attaining a healthy weight may be especially difficult to achieve in those with severe obesity. In some cases, medications, devices and surgical management might be considered.  What can you do?  If you are overweight or obese and are interested  in methods for weight loss, you should discuss this with your provider.     Consider reducing daily calorie intake by 500 calories.     Keep a food journal.     Avoiding skipping meals, consider cutting portions instead.    Diet combined with exercise helps maintain muscle while optimizing fat loss. Strength training is particularly important for building and maintaining muscle mass. Exercise helps reduce stress, increase energy, and improves fitness. Increasing exercise without diet control, however, may not burn enough calories to loose weight.       Start walking three days a week 10-20 minutes at a time    Work towards walking thirty minutes five days a week     Eventually, increase the speed of your walking for 1-2 minutes at time    In addition, we recommend that you review healthy lifestyles and methods for weight loss available through the National Institutes of Health patient information sites:  http://win.niddk.nih.gov/publications/index.htm    And look into health and wellness programs that may be available through your health insurance provider, employer, local community center, or armand club.    Weight management plan: Patient was referred to their PCP to discuss a diet and exercise plan.  Patient to follow up with Primary Care provider regarding elevated blood pressure.          Follow-ups after your visit        Follow-up notes from your care team     Return in 1 year (on 9/14/2018) for PAP follow up.      Who to contact     If you have questions or need follow up information about today's clinic visit or your schedule please contact [facility] directly at [phone].  Normal or non-critical lab and imaging results will be communicated to you by MyChart, letter or phone within 4 business days after the clinic has received the results. If you do not hear from us within 7 days, please contact the clinic through MyChart or phone. If you have a critical or abnormal lab result, we will  "notify you by phone as soon as possible.  Submit refill requests through [employer] or call your pharmacy and they will forward the refill request to us. Please allow 3 business days for your refill to be completed.          Additional Information About Your Visit        [employer] Information     [employer] lets you send messages to your doctor, view your test results, renew your prescriptions, schedule appointments and more. To sign up, go to [email or web address] . Click on \"Log in\" on the left side of the screen, which will take you to the Welcome page. Then click on \"Sign up Now\" on the right side of the page.     You will be asked to enter the access code listed below, as well as some personal information. Please follow the directions to create your username and password.     Your access code is: [email or web address]  Expires: 10/4/2017  1:25 PM     Your access code will  in 90 days. If you need help or a new code, please call your [place] clinic or [phone].        Care EveryWhere ID     This is your Care EveryWhere ID. This could be used by other organizations to access your [place] medical records  [id number]        Your Vitals Were     Pulse Respirations Height Pulse Oximetry BMI (Body Mass Index)       72 16 1.829 m (6') 96% 39.82 kg/m2        Blood Pressure from Last 3 Encounters:   17 (!) 160/103   17 (!) 141/96   17 (!) 155/92    Weight from Last 3 Encounters:   17 133.2 kg (293 lb 9.6 oz)   17 133.6 kg (294 lb 9.6 oz)   17 131.1 kg (289 lb)              Today, you had the following     No orders found for display       Primary Care Provider    None Specified       No primary provider on file.        Equal Access to Services     [facility] DOMINIQUE : Robbin Cordero, mark ford, qashruthi espinoza, jeremiah emery. [facility] [phone].    ATENCIÓN: Si habla español, tiene a canela disposición servicios gratuitos de asistencia " lingüísticaDelma Khan al [phone].    We comply with applicable federal civil rights laws and [place] laws. We do not discriminate on the basis of race, color, national origin, age, disability sex, sexual orientation or gender identity.            Thank you!     Thank you for choosing [facility] [facility]  for your care. Our goal is always to provide you with excellent care. Hearing back from our patients is one way we can continue to improve our services. Please take a few minutes to complete the written survey that you may receive in the mail after your visit with us. Thank you!             Your Updated Medication List - Protect others around you: Learn how to safely use, store and throw away your medicines at www.disposemymeds.org.          This list is accurate as of: 9/14/17 10:35 AM.  Always use your most recent med list.                   Brand Name Dispense Instructions for use Diagnosis    ADVIL 200 MG tablet   Generic drug:  ibuprofen      2 TABLET EVERY 4 TO 6 HOURS AS NEEDED

## 2017-09-19 ENCOUNTER — DOCUMENTATION ONLY (OUTPATIENT)
Dept: SLEEP MEDICINE | Facility: CLINIC | Age: 52
End: 2017-09-19

## 2017-09-19 DIAGNOSIS — I10 ESSENTIAL HYPERTENSION: ICD-10-CM

## 2017-09-19 DIAGNOSIS — E66.09 NON MORBID OBESITY DUE TO EXCESS CALORIES: ICD-10-CM

## 2017-09-19 DIAGNOSIS — G47.33 OSA (OBSTRUCTIVE SLEEP APNEA): ICD-10-CM

## 2017-09-19 NOTE — PROGRESS NOTES
[facility] [[email or web address]] recheck for compliance.     Data only recheck     Assessment: Pt not meeting objective benchmarks for leak and compliance     Action plan: compliance measures have increased usage increased since his office visit.   Device type: Auto-CPAP  PAP settings: CPAP min 6 cm  H20     CPAP max 18 cm  H20        95th% pressure 8.9 cm  H20   Objective measures: 14 day rolling measures      Compliance  57 % (increasing usage since last office visit)       Leak  40.6 lpm  last  upload      AHI 3.18   last  upload      Average number of minutes 210          Objective measure goal  Compliance   Goal >70%  Leak   Goal < 24 lpm  AHI  Goal < 5  Usage  Goal >240

## 2017-11-06 ENCOUNTER — OFFICE VISIT (OUTPATIENT)
Dept: INTERNAL MEDICINE | Facility: CLINIC | Age: 52
End: 2017-11-06
Payer: COMMERCIAL

## 2017-11-06 VITALS
OXYGEN SATURATION: 100 % | SYSTOLIC BLOOD PRESSURE: 140 MMHG | WEIGHT: 291 LBS | TEMPERATURE: 98.5 F | HEART RATE: 104 BPM | HEIGHT: 72 IN | DIASTOLIC BLOOD PRESSURE: 88 MMHG | BODY MASS INDEX: 39.42 KG/M2

## 2017-11-06 DIAGNOSIS — Z01.818 PREOP GENERAL PHYSICAL EXAM: Primary | ICD-10-CM

## 2017-11-06 LAB
ALBUMIN UR-MCNC: NEGATIVE MG/DL
APPEARANCE UR: CLEAR
BILIRUB UR QL STRIP: NEGATIVE
COLOR UR AUTO: YELLOW
ERYTHROCYTE [DISTWIDTH] IN BLOOD BY AUTOMATED COUNT: 12 % (ref 10–15)
GLUCOSE UR STRIP-MCNC: NEGATIVE MG/DL
HCT VFR BLD AUTO: 43.2 % (ref 40–53)
HGB BLD-MCNC: 14.8 G/DL (ref 13.3–17.7)
HGB UR QL STRIP: NEGATIVE
KETONES UR STRIP-MCNC: 15 MG/DL
LEUKOCYTE ESTERASE UR QL STRIP: NEGATIVE
MCH RBC QN AUTO: 32.9 PG (ref 26.5–33)
MCHC RBC AUTO-ENTMCNC: 34.3 G/DL (ref 31.5–36.5)
MCV RBC AUTO: 96 FL (ref 78–100)
NITRATE UR QL: NEGATIVE
PH UR STRIP: 5 PH (ref 5–7)
PLATELET # BLD AUTO: 179 10E9/L (ref 150–450)
RBC # BLD AUTO: 4.5 10E12/L (ref 4.4–5.9)
SOURCE: ABNORMAL
SP GR UR STRIP: 1.02 (ref 1–1.03)
UROBILINOGEN UR STRIP-ACNC: 0.2 EU/DL (ref 0.2–1)
WBC # BLD AUTO: 6.2 10E9/L (ref 4–11)

## 2017-11-06 PROCEDURE — 87640 STAPH A DNA AMP PROBE: CPT | Mod: 59 | Performed by: NURSE PRACTITIONER

## 2017-11-06 PROCEDURE — 85027 COMPLETE CBC AUTOMATED: CPT | Performed by: NURSE PRACTITIONER

## 2017-11-06 PROCEDURE — 36415 COLL VENOUS BLD VENIPUNCTURE: CPT | Performed by: NURSE PRACTITIONER

## 2017-11-06 PROCEDURE — 99214 OFFICE O/P EST MOD 30 MIN: CPT | Performed by: NURSE PRACTITIONER

## 2017-11-06 PROCEDURE — 81003 URINALYSIS AUTO W/O SCOPE: CPT | Performed by: NURSE PRACTITIONER

## 2017-11-06 PROCEDURE — 87641 MR-STAPH DNA AMP PROBE: CPT | Performed by: NURSE PRACTITIONER

## 2017-11-06 PROCEDURE — 80048 BASIC METABOLIC PNL TOTAL CA: CPT | Performed by: NURSE PRACTITIONER

## 2017-11-06 NOTE — PROGRESS NOTES
[facility]  [address]  [facility] [address]  [phone]  Dept: [phone]    PRE-OP EVALUATION:  Today's date: 2017    Rubén Rosas (: 1965) presents for pre-operative evaluation assessment as requested by Dr. Mcginnis .  He requires evaluation and anesthesia risk assessment prior to undergoing surgery/procedure for treatment of DJD Right hip .  Proposed procedure: Rt hip replacement     Date of Surgery/ Procedure: 17  Time of Surgery/ Procedure: Afternoon   Hospital/Surgical Facility: [facility]   Fax number for surgical facility: [phone]  Primary Physician: No Ref-Primary, Physician  Type of Anesthesia Anticipated: to be determined    Patient has a Health Care Directive or Living Will:  NO    Preop Questions 2017   1.  Do you have a history of heart attack, stroke, stent, bypass or surgery on an artery in the head, neck, heart or legs? No   2.  Do you ever have any pain or discomfort in your chest? No   3.  Do you have a history of  Heart Failure? No   4.   Are you troubled by shortness of breath when:  walking on a level surface, or up a slight hill, or at night? No   5.  Do you currently have a cold, bronchitis or other respiratory infection? No   6.  Do you have a cough, shortness of breath, or wheezing? No   7.  Do you sometimes get pains in the calves of your legs when you walk? No   8. Do you or anyone in your family have previous history of blood clots? No   9.  Do you or does anyone in your family have a serious bleeding problem such as prolonged bleeding following surgeries or cuts? No   10. Have you ever had problems with anemia or been told to take iron pills? No   11. Have you had any abnormal blood loss such as black, tarry or bloody stools? No   12. Have you ever had a blood transfusion? No   13. Have you or any of your relatives ever had problems with anesthesia? No   14. Do you have sleep apnea, excessive  snoring or daytime drowsiness? No   15. Do you have any prosthetic heart valves? No   16. Do you have prosthetic joints? YES - L total hip           HPI:                                                      Brief HPI related to upcoming procedure: R hip DJD      See problem list for active medical problems.  Problems all longstanding and stable, except as noted/documented.  See ROS for pertinent symptoms related to these conditions.                                                                                                  .    MEDICAL HISTORY:                                                    Patient Active Problem List    Diagnosis Date Noted     ARMANDO (obstructive sleep apnea) 07/21/2017     Priority: Medium     7/21/2017 - Home Sleep Apnea Testing - AHI 25.1/hr; Supine AHI 29.9/hr; SpO2 <= 88% for 3.5 minutes.       Essential hypertension 07/06/2017     Priority: Medium     Non morbid obesity due to excess calories 07/06/2017     Priority: Medium      History reviewed. No pertinent past medical history.  Past Surgical History:   Procedure Laterality Date     HIP SURGERY      left hip replacement      ROTATOR CUFF REPAIR RT/LT  2016    rt and bicept     Current Outpatient Prescriptions   Medication Sig Dispense Refill     ADVIL 200 MG PO TABS 2 TABLET EVERY 4 TO 6 HOURS AS NEEDED       OTC products: None, except as noted above    No Known Allergies   Latex Allergy: NO    Social History   Substance Use Topics     Smoking status: Never Smoker     Smokeless tobacco: Former User      Comment: quit chewing tobacco 2005     Alcohol use Not on file     History   Drug Use No       REVIEW OF SYSTEMS:                                                    C: NEGATIVE for fever, chills, change in weight  R: NEGATIVE for significant cough or SOB  CV: NEGATIVE for chest pain, palpitations or peripheral edema  GI: NEGATIVE for nausea, abdominal pain, heartburn, or change in bowel habits  : NEGATIVE for frequency, dysuria, or  hematuria  M: NEGATIVE for significant arthralgias or myalgia  N: NEGATIVE for weakness, dizziness or paresthesias  E: NEGATIVE for temperature intolerance, skin/hair changes  H: NEGATIVE for bleeding problems    EXAM:                                                    /88  Pulse 104  Temp 98.5  F (36.9  C) (Oral)  Ht 6' (1.829 m)  Wt 291 lb (132 kg)  SpO2 100%  BMI 39.47 kg/m2    GENERAL APPEARANCE: Obese     NECK: no adenopathy, no asymmetry, masses, or scars and thyroid normal to palpation     RESP: lungs clear to auscultation - no rales, rhonchi or wheezes     CV: regular rates and rhythm, normal S1 S2, no S3 or S4 and no murmur, click or rub     ABDOMEN:  soft, nontender, no HSM or masses and bowel sounds normal     MS: extremities normal- no gross deformities noted, no evidence of inflammation in joints, FROM in all extremities.     SKIN: no suspicious lesions or rashes     NEURO: Normal strength and tone, sensory exam grossly normal, mentation intact and speech normal     PSYCH: mentation appears normal. and affect normal/bright    DIAGNOSTICS:                                                    EKG: Not indicated due to non-vascular surgery and low risk of event (age <65 and without cardiac risk factors)    No results for input(s): HGB, PLT, INR, NA, POTASSIUM, CR, A1C in the last 40227 hours.     IMPRESSION:                                                    Reason for surgery/procedure: R hip DJD    The proposed surgical procedure is considered INTERMEDIATE risk.    REVISED CARDIAC RISK INDEX  The patient has the following serious cardiovascular risks for perioperative complications such as (MI, PE, VFib and 3  AV Block):  No serious cardiac risks  INTERPRETATION: 0 risks: Class I (very low risk - 0.4% complication rate)    The patient has the following additional risks for perioperative complications:  No identified additional risks      ICD-10-CM    1. Preop general physical exam Z01.818     (Z01.818) Preop general physical exam  (primary encounter diagnosis)  Comment:   Plan: CBC with platelets, Basic metabolic panel, UA         reflex to Microscopic and Culture, Methicillin         Resistant Staph Aureus PCR                RECOMMENDATIONS:                                                      --Consult hospital rounder / IM to assist post-op medical management    --Patient is to take all scheduled medications on the day of surgery EXCEPT for modifications listed below.    APPROVAL GIVEN to proceed with proposed procedure, without further diagnostic evaluation       Signed Electronically by: Magy Richards NP    Copy of this evaluation report is provided to requesting physician.    [facility] Preop Guidelines

## 2017-11-06 NOTE — MR AVS SNAPSHOT
After Visit Summary   11/6/2017    Rubén Rosas    MRN: [id number]           Patient Information     Date Of Birth          1965        Visit Information        Provider Department      11/6/2017 4:40 PM Magy Richards NP [facility]        Today's Diagnoses     Preop general physical exam    -  1      Care Instructions      Before Your Surgery      Call your surgeon if there is any change in your health. This includes signs of a cold or flu (such as a sore throat, runny nose, cough, rash or fever).    Do not smoke, drink alcohol or take over the counter medicine (unless your surgeon or primary care doctor tells you to) for the 24 hours before and after surgery.    If you take prescribed drugs: Follow your doctor s orders about which medicines to take and which to stop until after surgery.    Eating and drinking prior to surgery: follow the instructions from your surgeon    Take a shower or bath the night before surgery. Use the soap your surgeon gave you to gently clean your skin. If you do not have soap from your surgeon, use your regular soap. Do not shave or scrub the surgery site.  Wear clean pajamas and have clean sheets on your bed.   Before Your Surgery    Call your surgeon if there is any change in your health. This includes signs of a cold or flu (such as a sore throat, runny nose, cough, rash or fever).  Do not smoke, drink alcohol or take over the counter medicine (unless your surgeon or primary care doctor tells you to) for the 24 hours before and after surgery.  If you take prescribed drugs: Follow your doctor s orders about which medicines to take and which to stop until after surgery.  Eating and drinking prior to surgery: follow the instructions from your surgeon  Take a shower or bath the night before surgery. Use the soap your surgeon gave you to gently clean your skin. If you do not have soap from your surgeon, use your regular soap. Do not shave or  scrub the surgery site.  Wear clean pajamas and have clean sheets on your bed.   Before Your Surgery    Call your surgeon if there is any change in your health. This includes signs of a cold or flu (such as a sore throat, runny nose, cough, rash or fever).  Do not smoke, drink alcohol or take over the counter medicine (unless your surgeon or primary care doctor tells you to) for the 24 hours before and after surgery.  If you take prescribed drugs: Follow your doctor s orders about which medicines to take and which to stop until after surgery.  Eating and drinking prior to surgery: follow the instructions from your surgeon  Take a shower or bath the night before surgery. Use the soap your surgeon gave you to gently clean your skin. If you do not have soap from your surgeon, use your regular soap. Do not shave or scrub the surgery site.  Wear clean pajamas and have clean sheets on your bed.           Follow-ups after your visit        Your next 10 appointments already scheduled     Nov 06, 2017  4:40 PM CST   Pre-Op physical with Magy Richards NP   [facility] ([facility]    [facility] [address]  [facility] [id number] [phone]              Who to contact     If you have questions or need follow up information about today's clinic visit or your schedule please contact [facility] directly at [phone].  Normal or non-critical lab and imaging results will be communicated to you by MyChart, letter or phone within 4 business days after the clinic has received the results. If you do not hear from us within 7 days, please contact the clinic through MyChart or phone. If you have a critical or abnormal lab result, we will notify you by phone as soon as possible.  Submit refill requests through [employer] or call your pharmacy and they will forward the refill request to us. Please allow 3 business days for your refill to be completed.          Additional  "Information About Your Visit        MyChart Information     [employer] lets you send messages to your doctor, view your test results, renew your prescriptions, schedule appointments and more. To sign up, go to [email or web address] . Click on \"Log in\" on the left side of the screen, which will take you to the Welcome page. Then click on \"Sign up Now\" on the right side of the page.     You will be asked to enter the access code listed below, as well as some personal information. Please follow the directions to create your username and password.     Your access code is: [email or web address]  Expires: 2018  4:35 PM     Your access code will  in 90 days. If you need help or a new code, please call your [facility] clinic or [phone].        Care EveryWhere ID     This is your Care EveryWhere ID. This could be used by other organizations to access your [facility] medical records  [id number]        Your Vitals Were     Pulse Temperature Height Pulse Oximetry BMI (Body Mass Index)       104 98.5  F (36.9  C) (Oral) 6' (1.829 m) 100% 39.47 kg/m2        Blood Pressure from Last 3 Encounters:   17 140/88   17 (!) 160/103   17 (!) 141/96    Weight from Last 3 Encounters:   17 291 lb (132 kg)   17 293 lb 9.6 oz (133.2 kg)   17 294 lb 9.6 oz (133.6 kg)              We Performed the Following     Basic metabolic panel     CBC with platelets     Methicillin Resistant Staph Aureus PCR     UA reflex to Microscopic and Culture        Primary Care Provider    Physician No Ref-Primary       NO REF-PRIMARY PHYSICIAN        Equal Access to Services     DANNI FOX AH: Hadii asher Cordero, samirda logan, qaoralta kaalmada jeremiah espinoza. So [facility] [phone].    ATENCIÓN: Si habla español, tiene a canela disposición servicios gratuitos de asistencia lingüística. Llame al [phone].    We comply with applicable federal civil rights laws and [place] laws. " We do not discriminate on the basis of race, color, national origin, age, disability, sex, sexual orientation, or gender identity.            Thank you!     Thank you for choosing [facility]  for your care. Our goal is always to provide you with excellent care. Hearing back from our patients is one way we can continue to improve our services. Please take a few minutes to complete the written survey that you may receive in the mail after your visit with us. Thank you!             Your Updated Medication List - Protect others around you: Learn how to safely use, store and throw away your medicines at www.disposemymeds.org.          This list is accurate as of: 11/6/17  4:35 PM.  Always use your most recent med list.                   Brand Name Dispense Instructions for use Diagnosis    ADVIL 200 MG tablet   Generic drug:  ibuprofen      2 TABLET EVERY 4 TO 6 HOURS AS NEEDED

## 2017-11-06 NOTE — PATIENT INSTRUCTIONS
Before Your Surgery      Call your surgeon if there is any change in your health. This includes signs of a cold or flu (such as a sore throat, runny nose, cough, rash or fever).    Do not smoke, drink alcohol or take over the counter medicine (unless your surgeon or primary care doctor tells you to) for the 24 hours before and after surgery.    If you take prescribed drugs: Follow your doctor s orders about which medicines to take and which to stop until after surgery.    Eating and drinking prior to surgery: follow the instructions from your surgeon    Take a shower or bath the night before surgery. Use the soap your surgeon gave you to gently clean your skin. If you do not have soap from your surgeon, use your regular soap. Do not shave or scrub the surgery site.  Wear clean pajamas and have clean sheets on your bed.   Before Your Surgery    Call your surgeon if there is any change in your health. This includes signs of a cold or flu (such as a sore throat, runny nose, cough, rash or fever).  Do not smoke, drink alcohol or take over the counter medicine (unless your surgeon or primary care doctor tells you to) for the 24 hours before and after surgery.  If you take prescribed drugs: Follow your doctor s orders about which medicines to take and which to stop until after surgery.  Eating and drinking prior to surgery: follow the instructions from your surgeon  Take a shower or bath the night before surgery. Use the soap your surgeon gave you to gently clean your skin. If you do not have soap from your surgeon, use your regular soap. Do not shave or scrub the surgery site.  Wear clean pajamas and have clean sheets on your bed.   Before Your Surgery    Call your surgeon if there is any change in your health. This includes signs of a cold or flu (such as a sore throat, runny nose, cough, rash or fever).  Do not smoke, drink alcohol or take over the counter medicine (unless your surgeon or primary care doctor tells  you to) for the 24 hours before and after surgery.  If you take prescribed drugs: Follow your doctor s orders about which medicines to take and which to stop until after surgery.  Eating and drinking prior to surgery: follow the instructions from your surgeon  Take a shower or bath the night before surgery. Use the soap your surgeon gave you to gently clean your skin. If you do not have soap from your surgeon, use your regular soap. Do not shave or scrub the surgery site.  Wear clean pajamas and have clean sheets on your bed.

## 2017-11-07 LAB
ANION GAP SERPL CALCULATED.3IONS-SCNC: 7 MMOL/L (ref 3–14)
BUN SERPL-MCNC: 22 MG/DL (ref 7–30)
CALCIUM SERPL-MCNC: 9.2 MG/DL (ref 8.5–10.1)
CHLORIDE SERPL-SCNC: 107 MMOL/L (ref 94–109)
CO2 SERPL-SCNC: 25 MMOL/L (ref 20–32)
CREAT SERPL-MCNC: 1.04 MG/DL (ref 0.66–1.25)
GFR SERPL CREATININE-BSD FRML MDRD: 74 ML/MIN/1.7M2
GLUCOSE SERPL-MCNC: 86 MG/DL (ref 70–99)
MRSA DNA SPEC QL NAA+PROBE: NEGATIVE
POTASSIUM SERPL-SCNC: 3.9 MMOL/L (ref 3.4–5.3)
SODIUM SERPL-SCNC: 139 MMOL/L (ref 133–144)
SPECIMEN SOURCE: NORMAL

## 2018-01-23 ENCOUNTER — DOCUMENTATION ONLY (OUTPATIENT)
Dept: SLEEP MEDICINE | Facility: CLINIC | Age: 53
End: 2018-01-23

## 2018-01-23 DIAGNOSIS — I10 ESSENTIAL HYPERTENSION: ICD-10-CM

## 2018-01-23 DIAGNOSIS — E66.09 NON MORBID OBESITY DUE TO EXCESS CALORIES: ICD-10-CM

## 2018-01-23 DIAGNOSIS — G47.33 OSA (OBSTRUCTIVE SLEEP APNEA): ICD-10-CM

## 2018-01-23 NOTE — PROGRESS NOTES
6 Month [facility] visit    Data only recheck     Assessment: Pt not meeting objective benchmarks for compliance. Patient returned device due to no longer willing to use it.      Action plan:   pt to follow up per provider request (1-2 yrs)  Device type: Auto-CPAP  PAP settings: CPAP min 6 cm  H20     CPAP max 18 cm  H20         Objective measures: 14 day rolling measures                   Objective measure goal  Compliance   Goal >70%  Leak   Goal < 10%  AHI  Goal < 5  Usage  Goal >240

## 2018-02-13 ENCOUNTER — TELEPHONE (OUTPATIENT)
Dept: INTERNAL MEDICINE | Facility: CLINIC | Age: 53
End: 2018-02-13

## 2018-02-13 NOTE — TELEPHONE ENCOUNTER
Panel Management Review      Patient has the following on his problem list:     Hypertension   Last three blood pressure readings:  BP Readings from Last 3 Encounters:   11/06/17 140/88   09/14/17 (!) 160/103   07/21/17 (!) 141/96     Blood pressure: Passed    HTN Guidelines:  Age 18-59 BP range:  Less than 140/90  Age 60-85 with Diabetes:  Less than 140/90  Age 60-85 without Diabetes:  less than 150/90      Composite cancer screening  Chart review shows that this patient is due/due soon for the following Colonoscopy  Summary:    Patient is due/failing the following:   COLONOSCOPY    Action needed:   Patient needs referral/order: Colonoscopy    Type of outreach:    Sent letter.    Questions for provider review:    None                                                                                                                                    DONI KNOX CMA       Chart routed to no one .

## 2018-08-20 ENCOUNTER — TELEPHONE (OUTPATIENT)
Dept: INTERNAL MEDICINE | Facility: CLINIC | Age: 53
End: 2018-08-20

## 2018-08-20 NOTE — LETTER
[facility]  [address], [address]  [place], [place]  [address]                                            TEL:[phone]  FAX:      Rubén Rosas  [address] [place] [address]  [facility] [address]      August 20, 2018    Dear Rubén,    At [facility] we care about your health and well-being.  A review of your chart has indicated that you are overdue for a colon cancer screening. If you have already had a colonoscopy within the past 10 years, please request a copy be sent from the performing physician.  If screening has not been done, a colonoscopy is the best test and is done every 10 years.  The alternate option is stool testing for blood, FIT test, done yearly.  Please contact our office at [phone] to indicate which testing you prefer.         Sincerely,      Magy Richards C.N.P.

## 2018-08-20 NOTE — LETTER
[facility]  [address], [address]  [place], [place]  [address]                                            TEL:[phone]  FAX:[phone]      Rubén Rosas  [address] [place] [address]  [facility] [address]      September 4, 2018    Dear Rubén,    We sent you a letter a couple of weeks ago informing you of health maintenance that is due. We hope that you received it. This letter is just a follow up to remind you to schedule an appointment.            Sincerely,      Magy Richards C.N.P.

## 2018-08-20 NOTE — TELEPHONE ENCOUNTER
Panel Management Review      Patient has the following on his problem list:     Hypertension   Last three blood pressure readings:  BP Readings from Last 3 Encounters:   11/06/17 140/88   09/14/17 (!) 160/103   07/21/17 (!) 141/96     Blood pressure: Passed    HTN Guidelines:  Age 18-59 BP range:  Less than 140/90  Age 60-85 with Diabetes:  Less than 140/90  Age 60-85 without Diabetes:  less than 150/90      Composite cancer screening  Chart review shows that this patient is due/due soon for the following Colonoscopy  Summary:    Patient is due/failing the following:   COLONOSCOPY    Action needed:   Patient needs referral/order: Colonoscopy    Type of outreach:    Sent letter.    Questions for provider review:    None                                                                                                                                    DONI KNOX CMA       Chart routed to Care Team .